# Patient Record
Sex: MALE | Race: WHITE
[De-identification: names, ages, dates, MRNs, and addresses within clinical notes are randomized per-mention and may not be internally consistent; named-entity substitution may affect disease eponyms.]

---

## 2017-03-02 ENCOUNTER — HOSPITAL ENCOUNTER (EMERGENCY)
Age: 47
Discharge: HOME | End: 2017-03-02
Payer: OTHER GOVERNMENT

## 2017-03-05 ENCOUNTER — HOSPITAL ENCOUNTER (EMERGENCY)
Age: 47
Discharge: HOME | End: 2017-03-05
Payer: OTHER GOVERNMENT

## 2017-03-05 DIAGNOSIS — R03.0: ICD-10-CM

## 2017-03-05 DIAGNOSIS — F41.9: Primary | ICD-10-CM

## 2017-03-05 DIAGNOSIS — H66.001: ICD-10-CM

## 2017-03-10 ENCOUNTER — HOSPITAL ENCOUNTER (EMERGENCY)
Age: 47
Discharge: HOME | End: 2017-03-10
Payer: OTHER GOVERNMENT

## 2017-03-10 DIAGNOSIS — F41.9: Primary | ICD-10-CM

## 2017-03-10 PROCEDURE — 99283 EMERGENCY DEPT VISIT LOW MDM: CPT

## 2019-03-01 ENCOUNTER — HOSPITAL ENCOUNTER (EMERGENCY)
Dept: HOSPITAL 76 - ED | Age: 49
Discharge: HOME | End: 2019-03-01
Payer: OTHER GOVERNMENT

## 2019-03-01 VITALS — SYSTOLIC BLOOD PRESSURE: 121 MMHG | DIASTOLIC BLOOD PRESSURE: 88 MMHG

## 2019-03-01 DIAGNOSIS — F41.9: Primary | ICD-10-CM

## 2019-03-01 DIAGNOSIS — R10.13: ICD-10-CM

## 2019-03-01 LAB
ANION GAP SERPL CALCULATED.4IONS-SCNC: 8 MMOL/L (ref 6–13)
BUN SERPL-MCNC: 15 MG/DL (ref 6–20)
CALCIUM UR-MCNC: 9.4 MG/DL (ref 8.5–10.3)
CHLORIDE SERPL-SCNC: 107 MMOL/L (ref 101–111)
CO2 SERPL-SCNC: 26 MMOL/L (ref 21–32)
CREAT SERPLBLD-SCNC: 1 MG/DL (ref 0.6–1.2)
GFRSERPLBLD MDRD-ARVRAT: 79 ML/MIN/{1.73_M2} (ref 89–?)
GLUCOSE SERPL-MCNC: 105 MG/DL (ref 70–100)
SODIUM SERPLBLD-SCNC: 141 MMOL/L (ref 135–145)

## 2019-03-01 PROCEDURE — 99281 EMR DPT VST MAYX REQ PHY/QHP: CPT

## 2019-03-01 PROCEDURE — 80048 BASIC METABOLIC PNL TOTAL CA: CPT

## 2019-03-01 PROCEDURE — 36415 COLL VENOUS BLD VENIPUNCTURE: CPT

## 2019-03-01 PROCEDURE — 99283 EMERGENCY DEPT VISIT LOW MDM: CPT

## 2019-03-01 NOTE — ED PHYSICIAN DOCUMENTATION
History of Present Illness





- Stated complaint


Stated Complaint: STOMACH BURNING, FEELINGS OF PASSING OUT





- Chief complaint


Chief Complaint: MHE





- History obtained from


History obtained from: Patient





- History of Present Illness


Timing: Prior to arrival





- Additonal information


Additional information: 





The patient is a 48-year-old male with history of anxiety disorder and 

agoraphobia, who reports having a near-syncopal episode while at [employer] just

prior to arrival.  He had been in the store for a short time when he felt 

anxious, and felt as if he would pass out.  He ran out of the store, but his 

symptoms persisted.  He denies any associated chest pain, shortness of breath, 

fever, cough, or vomiting.  He has history of similar symptoms in the past, and 

states he gets anxiety symptoms on an almost daily basis.  He has not been 

sleeping well lately.  He does not drink any coffee or other caffeine-containing

beverages.  He denies the use of alcohol or drugs.  He has a history of 

disability from the [place] because of his anxiety disorder.





Review of Systems


Constitutional: denies: Fever, Fatigue


Eyes: denies: Decreased vision


Ears: denies: Tinnitus/ringing


Nose: denies: Congestion


Throat: denies: Sore throat


Cardiac: denies: Chest pain / pressure


Respiratory: denies: Dyspnea, Cough


GI: reports: Nausea.  denies: Abdominal Pain, Vomiting


: denies: Dysuria


Skin: denies: Rash


Musculoskeletal: denies: Back pain, Extremity pain


Neurologic: denies: Focal weakness, Numbness, Headache


Psychiatric: reports: Anxiety





PD PAST MEDICAL HISTORY





- Past Medical History


Cardiovascular: None


Respiratory: None


Endocrine/Autoimmune: None


GI: None


Psych: Anxiety





- Past Surgical History


Past Surgical History: No





- Present Medications


Home Medications: 


                                Ambulatory Orders











 Medication  Instructions  Recorded  Confirmed


 


Lorazepam [Ativan] 1 mg PO TID PRN #15 tablet 03/02/17 03/10/17


 


PARoxetine [Paxil] 20 mg PO DAILY #30 tablet 03/02/17 03/10/17


 


Azithromycin [Zithromax] 250 mg PO DAILY #6 tablet 03/05/17 03/10/17


 


LORazepam [Lorazepam] 1 mg PO QID PRN #20 tablet 03/10/17 














- Allergies


Allergies/Adverse Reactions: 


                                    Allergies











Allergy/AdvReac Type Severity Reaction Status Date / Time


 


paroxetine [From Paxil] Allergy  Hallucinati Verified 03/01/19 13:45





   ons  














- Living Situation


Living Situation: reports: With family


Living Arrangement: reports: At home





- Social History


Does the pt smoke?: No


Smoking Status: Never smoker


Does the pt drink ETOH?: No


Does the pt have substance abuse?: No





- POLST


Patient has POLST: No





PD ED PE NORMAL





- Vitals


Vital signs reviewed: Yes (Initially hypertensive.)





- General


General: Alert and oriented X 3, Well developed/nourished





- HEENT


HEENT: Atraumatic, EOMI, Moist mucous membranes, Pharynx benign





- Neck


Neck: Supple, no meningeal sign, No adenopathy





- Cardiac


Cardiac: RRR





- Respiratory


Respiratory: No respiratory distress, Clear bilaterally





- Abdomen


Abdomen: Soft, Other (Mild epigastric tenderness to palpation, without rebound 

or guarding.)





- Back


Back: No CVA TTP





- Derm


Derm: No rash





- Extremities


Extremities: No edema, No calf tenderness / cord





- Neuro


Neuro: Alert and oriented X 3, No motor deficit, No sensory deficit





- Psych


Psych: Other (There is minimally anxious at this time, without shakiness or 

apparent respiratory distress.  He verbalizes freely regarding his symptoms.)





Results





- Vitals


Vitals: 


                                     Oxygen











O2 Source                      Room air

















- Labs


Labs: 


                                Laboratory Tests











  03/01/19





  15:40


 


Sodium  141


 


Potassium  4.3


 


Chloride  107


 


Carbon Dioxide  26


 


Anion Gap  8.0


 


BUN  15


 


Creatinine  1.0


 


Estimated GFR (MDRD)  79 L


 


Glucose  105 H


 


Calcium  9.4














PD MEDICAL DECISION MAKING





- ED course


Complexity details: reviewed old records, reviewed results, re-evaluated 

patient, considered differential, d/w patient


ED course: 


The patient's presentation is most consistent with acute anxiety reaction, with 

history of anxiety disorder and agoraphobia.  His presentation does not suggest 

dehydration, acute infectious process, neurologic or cardiovascular disorder.  

The possibility of epigastric distress was considered.  GI cocktail was 

administered to no appreciable effect.  The patient's symptoms resolved 

spontaneously over a course of observation in the emergency department.  No 

further treatment is clinically warranted at this time.  I discussed with him 

potentially worrisome signs or symptoms that should prompt reevaluation in the 

emergency department.








Departure





- Departure


Disposition: 01 Home, Self Care


Clinical Impression: 


 Anxiety attack





Condition: Stable


Instructions:  ED Stress React


Follow-Up: 


Peggy Sadler MD [Provider Admit Priv/Credential] - 


Comments: 


Follow-up with your primary physician within 2 weeks.  Call to schedule 

appointment.


Return to the emergency department if you develop progressively increasing 

anxiety, shortness of breath, lightheadedness, or otherwise worsening symptoms.


Discharge Date/Time: 03/01/19 16:35

## 2020-03-02 ENCOUNTER — HOSPITAL ENCOUNTER (EMERGENCY)
Dept: HOSPITAL 76 - ED | Age: 50
LOS: 1 days | Discharge: HOME | End: 2020-03-03
Payer: OTHER GOVERNMENT

## 2020-03-02 DIAGNOSIS — M79.89: Primary | ICD-10-CM

## 2020-03-02 DIAGNOSIS — R21: ICD-10-CM

## 2020-03-02 PROCEDURE — 99283 EMERGENCY DEPT VISIT LOW MDM: CPT

## 2020-03-02 PROCEDURE — 99284 EMERGENCY DEPT VISIT MOD MDM: CPT

## 2020-03-02 PROCEDURE — 93971 EXTREMITY STUDY: CPT

## 2020-03-02 NOTE — ED PHYSICIAN DOCUMENTATION
History of Present Illness





- Stated complaint


Stated Complaint: L LEG SWELLING





- Chief complaint


Chief Complaint: Wound





- History obtained from


History obtained from: Patient





- History of Present Illness


Timing: How many weeks ago (2-3)


Improved by: nothing


Worsened by: no apparent inciting nor exacerbating factors





- Additonal information


Additional information: 





c/o 2-3 weeks of gradual onset, gradually increasing LLE swelling, erythema, 

pruritis. He says he has same symptoms (LLE only) every year around this time 

but typically resolves with application of some OTC topical medications and thus

has not seen a doctor for this. Denies injury, denies chest pain, denies dyspnea





Review of Systems


Constitutional: denies: Fever, Chills, Sweats


Cardiac: reports: Reviewed and negative


Respiratory: reports: Reviewed and negative


Skin: reports: Rash (LLE)


Musculoskeletal: reports: Extremity swelling





PD PAST MEDICAL HISTORY





- Past Medical History


Past Medical History: Yes


Cardiovascular: None


Respiratory: None


Endocrine/Autoimmune: None


GI: None


Psych: Anxiety





- Past Surgical History


Past Surgical History: No





- Present Medications


Home Medications: 


                                Ambulatory Orders











 Medication  Instructions  Recorded  Confirmed


 


Lorazepam [Ativan] 1 mg PO TID PRN #15 tablet 03/02/17 03/10/17


 


PARoxetine [Paxil] 20 mg PO DAILY #30 tablet 03/02/17 03/10/17


 


Azithromycin [Zithromax] 250 mg PO DAILY #6 tablet 03/05/17 03/10/17


 


LORazepam [Lorazepam] 1 mg PO QID PRN #20 tablet 03/10/17 


 


Sulfamethox/Trimeth 800/160 1 each PO BID #14 tablet 03/03/20 





[Bactrim Ds 800/160]   














- Allergies


Allergies/Adverse Reactions: 


                                    Allergies











Allergy/AdvReac Type Severity Reaction Status Date / Time


 


paroxetine [From Paxil] Allergy  Hallucinati Verified 03/01/19 13:45





   ons  














- Social History


Does the pt smoke?: No


Smoking Status: Never smoker


Does the pt drink ETOH?: No


Does the pt have substance abuse?: No





- Immunizations


Immunizations are current?: No





- POLST


Patient has POLST: No





PD ED PE NORMAL





- Vitals


Vital signs reviewed: Yes





- General


General: Alert and oriented X 3, No acute distress, Well developed/nourished





- Cardiac


Cardiac: RRR, No murmur





PD ED PE EXPANDED





- Extremities


Extremities: Swelling.  No: Tenderness, Limited ROM


MICHAELA LE visual: 


                            __________________________














                            __________________________





 1 - rash (patchy, flat erythema with some areas of confluence. not warmer to 

touch than surrounding skin or other extremity), swelling








Results





- Vitals


Vitals: 


                               Vital Signs - 24 hr











  03/02/20 03/03/20





  22:07 01:02


 


Temperature 37.4 C 


 


Heart Rate 96 80


 


Respiratory 18 18





Rate  


 


Blood Pressure 170/100 H 134/82 H


 


O2 Saturation 96 98








                                     Oxygen











O2 Source                      Room air

















- Rads (name of study)


  ** LLE US


Radiology: Prelim report reviewed, See rad report





PD MEDICAL DECISION MAKING





- ED course


Complexity details: reviewed results, re-evaluated patient, considered 

differential, d/w patient


ED course: 





US negative for DVT. some elements of exam suggest cellulitis, and thus will rx 

bactrim for this possibility. 





Departure





- Departure


Disposition: 01 Home, Self Care


Clinical Impression: 


 Left leg swelling





Condition: Good


Instructions:  ED Leg Swelling Unilateral


Follow-Up: 


Karly Dumont MD [Primary Care Provider] - 


Prescriptions: 


Sulfamethox/Trimeth 800/160 [Bactrim Ds 800/160] 1 each PO BID #14 tablet


Discharge Date/Time: 03/03/20 01:02

## 2020-03-03 VITALS — DIASTOLIC BLOOD PRESSURE: 82 MMHG | SYSTOLIC BLOOD PRESSURE: 134 MMHG

## 2020-03-03 NOTE — ULTRASOUND REPORT
Reason:  atraumatic swelling LLE

Procedure Date:  03/02/2020   

Accession Number:  [id number] / [id number]                    

Procedure:  US  - Duplex Ext Veins Left CPT Code:  

 

***Final Report***

 

 

FULL RESULT:

 

 

EXAM:

LEFT LOWER EXTREMITY VENOUS ULTRASOUND

 

EXAM DATE: 3/2/2020 11:59 PM

 

CLINICAL HISTORY: Atraumatic swelling left lower extremity.

 

COMPARISON: None.

 

TECHNIQUE: Real-time sonographic vascular imaging was performed by the 

sonographer through the lower extremity utilizing both color-flow and 

Doppler spectral analysis. Multiple representative static images were 

saved for review.

 

FINDINGS:

Common Femoral Vein (CFV): Normal.

CFV-GSV Junction: Normal.

Profunda Femoral Vein (PFV): Normal.

Femoral Vein (FV) Prox: Normal.

Femoral Vein (FV) Mid: Normal.

Femoral Vein (FV) Dist: Normal.

Popliteal Vein: Normal.

Posterior Tibial Veins: Normal.

Peroneal Veins: Normal.

 

Other: None.

IMPRESSION:

No evidence for left leg deep venous thrombosis.

 

[facility]

## 2021-04-15 ENCOUNTER — HOSPITAL ENCOUNTER (EMERGENCY)
Dept: HOSPITAL 76 - ED | Age: 51
Discharge: HOME | End: 2021-04-15
Payer: OTHER GOVERNMENT

## 2021-04-15 VITALS — DIASTOLIC BLOOD PRESSURE: 90 MMHG | SYSTOLIC BLOOD PRESSURE: 130 MMHG

## 2021-04-15 DIAGNOSIS — K08.89: Primary | ICD-10-CM

## 2021-04-15 DIAGNOSIS — K02.9: ICD-10-CM

## 2021-04-15 PROCEDURE — 99284 EMERGENCY DEPT VISIT MOD MDM: CPT

## 2021-04-15 PROCEDURE — 99282 EMERGENCY DEPT VISIT SF MDM: CPT

## 2021-04-15 NOTE — ED PHYSICIAN DOCUMENTATION
History of Present Illness





- Stated complaint


Stated Complaint: TOOTH PX/RT SIDE PX





- Chief complaint


Chief Complaint: Heent





- Additonal information


Additional information: 





50-year-old male presents emergency department for evaluation of acute on 

chronic tooth pain.  He has multiple teeth that are missing and decayed to the 

gumline especially the upper molars on both sides of his mouth.  Over the last 

few days he has been having increasing pain especially in the right upper jaw 

with radiation to the ear.  The only thing that makes it better is sipping water

or soda.  [facility] den

tist as soon as possible but he admits that he has not seen a dentist for likely

about 20 years now. He has been applying Anbesol and Orajel to his teeth as well

as taking 600 of Motrin 3 times a day and Tylenol.





He has no fevers or trismus.  Normal phonation and swallow.  No facial swelling.





Review of Systems


Constitutional: denies: Fever, Chills


Eyes: reports: Reviewed and negative


Ears: reports: Reviewed and negative


Nose: reports: Reviewed and negative


Throat: reports: Dental pain / toothache.  denies: Sore throat, Swollen tonsils,

Swallowed foreign body


Cardiac: reports: Reviewed and negative


Respiratory: reports: Reviewed and negative


GI: reports: Reviewed and negative


: reports: Reviewed and negative


Skin: reports: Reviewed and negative


Musculoskeletal: reports: Reviewed and negative





PD PAST MEDICAL HISTORY





- Past Medical History


Past Medical History: Yes


Cardiovascular: None


Respiratory: None


Endocrine/Autoimmune: None


GI: None


Psych: Anxiety





- Past Surgical History


Past Surgical History: No





- Present Medications


Home Medications: 


                                Ambulatory Orders











 Medication  Instructions  Recorded  Confirmed


 


Lorazepam [Ativan] 1 mg PO TID PRN #15 tablet 03/02/17 03/10/17


 


PARoxetine [Paxil] 20 mg PO DAILY #30 tablet 03/02/17 03/10/17


 


Azithromycin [Zithromax] 250 mg PO DAILY #6 tablet 03/05/17 03/10/17


 


LORazepam [Lorazepam] 1 mg PO QID PRN #20 tablet 03/10/17 


 


Sulfamethox/Trimeth 800/160 1 each PO BID #14 tablet 03/03/20 





[Bactrim Ds 800/160]   


 


Amox/Clav 875/125 [Augmentin] 1 each PO Q12H #14 tablet 04/15/21 


 


HYDROcod/ACETAM 5/325 [Norco 5/325] 1 - 2 tablet PO Q6H PRN #10 tablet 04/15/21 














- Allergies


Allergies/Adverse Reactions: 


                                    Allergies











Allergy/AdvReac Type Severity Reaction Status Date / Time


 


paroxetine [From Paxil] Allergy  Hallucinati Verified 04/15/21 20:15





   ons  














- Social History


Does the pt smoke?: No


Smoking Status: Never smoker


Does the pt drink ETOH?: No


Does the pt have substance abuse?: No





- Immunizations


Immunizations are current?: No





- POLST


Patient has POLST: No





PD ED PE EXPANDED





- General


General: Alert, In Pain





- HEENT


HEENT: EOMI, Ears normal, Pharynx normal, Dental decay, Dental TTP.  No: 

Pharyngeal erythema, Swollen tonsils, Tonsillar exudate, Dental trauma, Dental 

abscess (Upper and lower molars on both sides of his jaw are decayed to the 

gumline.  There is mild gumline erythema without fluctuance or obvious 

drainage.)





- Neck


Neck: Supple w/out meningeal sx, No tenderness.  No: Adenopathy





- Cardiac


Cardiac: Regular Rate, Radial strong equal, Pedal strong equal, Cap refill < 2 

sec





- Respiratory


Respiratory: Clear to ausultation calista.  No: Distress, Labored





- Abdomen


Abdomen: Normal Bowel sounds.  No: Tender to palpation





Results





- Vitals


Vitals: 





                               Vital Signs - 24 hr











  04/15/21





  20:11


 


Temperature 36.7 C


 


Heart Rate 72


 


Respiratory 16





Rate 


 


Blood Pressure 154/95 H


 


O2 Saturation 96








                                     Oxygen











O2 Source                      Room air

















PD MEDICAL DECISION MAKING





- ED course


Complexity details: reviewed results, re-evaluated patient, d/w patient


ED course: 





50-year-old male who drove himself here to the emergency department presents for

acute on chronic dentalgia.  He has multiple molars on both sides of the jaw 

that are decayed to the gumline.  There is no trismus dysphonia or fevers.  He 

is working to get set up with a dentist.  He does seem to be taking reasonable 

measures at home to control his dental pain but has been unsuccessful and 

appears quite uncomfortable.  I suspect that he may have an underlying infection

causing the worsening pain therefore I will start him on a course of Augmentin. 

I will also write for a limited amount of narcotics including a prepack to take 

home tonight.  Emergent and worrisome return precautions were discussed.





Departure





- Departure


Disposition: 01 Home, Self Care


Clinical Impression: 


 Dentalgia, Dental decay





Condition: Stable


Record reviewed to determine appropriate education?: Yes


Prescriptions: 


Amox/Clav 875/125 [Augmentin] 1 each PO Q12H #14 tablet


HYDROcod/ACETAM 5/325 [Norco 5/325] 1 - 2 tablet PO Q6H PRN #10 tablet


 PRN Reason: Pain


Comments: 


You are seen in the emergency department tonight for dental pain.  You have 

multiple decaying teeth that are the cause of your dental pain.  Your ears are 

not infected.  Your ear pain is likely referred due to the nerves in the teeth 

that are inflamed.  I think the pain will improve if we put you on a course of 

antibiotics.  Your first dose of Augmentin was given in the emergency department

tonight tomorrow please fill the prescription and begin taking as directed.  

Would also like you to rinse your mouth with warm salt water 3 times a day.  

Continue with the ibuprofen and Orajel.  I have prescribed a very limited amount

of hydrocodone to be used for the severe pain.  The emergency department will 

not be able to refill this in the future.





Continue to establish yourself with a dentist as soon as you are able.





Return to the emergency department if you have facial swelling, cannot open your

jaw, cannot swallow normally speak normally or develop any fevers.

## 2022-02-26 ENCOUNTER — HOSPITAL ENCOUNTER (EMERGENCY)
Dept: HOSPITAL 76 - ED | Age: 52
Discharge: HOME | End: 2022-02-26
Payer: OTHER GOVERNMENT

## 2022-02-26 VITALS — SYSTOLIC BLOOD PRESSURE: 155 MMHG | DIASTOLIC BLOOD PRESSURE: 95 MMHG

## 2022-02-26 DIAGNOSIS — K02.9: Primary | ICD-10-CM

## 2022-02-26 PROCEDURE — 99282 EMERGENCY DEPT VISIT SF MDM: CPT

## 2022-02-26 PROCEDURE — 99283 EMERGENCY DEPT VISIT LOW MDM: CPT

## 2022-02-26 NOTE — ED PHYSICIAN DOCUMENTATION
PD HPI HEENT





- Stated complaint


Stated Complaint: R EAR PX





- Chief complaint


Chief Complaint: Heent





- History obtained from


History obtained from: Patient





- Additional information


Additional information: 





4 days of severe right mandibular dental pain.  He has known cavity there.  Has 

not seen a dentist in quite some time.  Now pain radiating to the right ear with

popping.  No fevers.





Review of Systems


Constitutional: reports: Reviewed and negative


Nose: reports: Reviewed and negative


Cardiac: reports: Reviewed and negative


Respiratory: reports: Reviewed and negative





PD PAST MEDICAL HISTORY





- Past Medical History


Cardiovascular: None


Respiratory: None


Endocrine/Autoimmune: None


GI: None


Psych: Anxiety





- Past Surgical History


Past Surgical History: No





- Present Medications


Home Medications: 


                                Ambulatory Orders











 Medication  Instructions  Recorded  Confirmed


 


HYDROcod/ACETAM 5/325 [Norco 5/325] 1 - 2 tablet PO Q6H PRN #10 tablet 04/15/21 


 


Amox/Clav 875/125 [Augmentin] 1 each PO Q12H #20 tablet 02/26/22 


 


HYDROcod/ACETAM 5/325 [Norco 5/325] 1 - 2 tab PO Q6H PRN #15 tablet 02/26/22 


 


Ibuprofen 2 tab ORAL Q6HR 02/26/22 02/26/22


 


Ibuprofen [Motrin] 800 mg PO Q8H PRN #30 tablet 02/26/22 














- Allergies


Allergies/Adverse Reactions: 


                                    Allergies











Allergy/AdvReac Type Severity Reaction Status Date / Time


 


paroxetine [From Paxil] Allergy  Hallucinati Verified 04/15/21 20:15





   ons  














- Social History


Does the pt smoke?: No


Smoking Status: Never smoker


Does the pt drink ETOH?: No


Does the pt have substance abuse?: No





- Immunizations


Immunizations are current?: No





- POLST


Patient has POLST: No





PD ED PE NORMAL





- Vitals


Vital signs reviewed: Yes





- General


General: Alert and oriented X 3, No acute distress





- HEENT


HEENT: Other (Right TM appears normal, he has generally poor dentition with many

cavities, the culprit tooth is the last remaining mandibular molar with 

tenderness but no sublingual edema or trismus.  No facial swelling.)





- Neck


Neck: Supple, no meningeal sign, No bony TTP





- Cardiac


Cardiac: RRR, No murmur





- Neuro


Neuro: Alert and oriented X 3, Normal speech





- Psych


Psych: Normal mood, Normal affect





Results





- Vitals


Vitals: 





                               Vital Signs - 24 hr











  02/26/22





  18:46


 


Temperature 36.3 C L


 


Heart Rate 84


 


Respiratory 20





Rate 


 


Blood Pressure 155/95 H


 


O2 Saturation 99








                                     Oxygen











O2 Source                      Room air

















Departure





- Departure


Disposition: 01 Home, Self Care


Clinical Impression: 


 Pain due to dental caries





Condition: Good


Record reviewed to determine appropriate education?: Yes


Instructions:  ED Tooth Pain


Prescriptions: 


Amox/Clav 875/125 [Augmentin] 1 each PO Q12H #20 tablet


Ibuprofen [Motrin] 800 mg PO Q8H PRN #30 tablet


 PRN Reason: PAIN &/OR FEVER


HYDROcod/ACETAM 5/325 [Norco 5/325] 1 - 2 tab PO Q6H PRN #15 tablet


 PRN Reason: Pain


Comments: 


I sent your prescription electronically to [facility] in [place].





It is very important that you follow-up with a dentist.  When it comes to dental

problems like yours, the emergency department can only offer a short-term 

solution to your long-term problem.  A couple of low cost options for dental 

care include:


[facility] in [place], calls [phone] for an appointment


Or


[facility] in [place], call [phone] for an 

appointment.





I am prescribing a short course of narcotic pain medication for you. These are 

potentially dangerous and addictive medications that should be used carefully.


These medications may constipate you. Take an over-the-counter stool softener 

(docusate) twice daily with plenty of water while taking these medications. If 

you go 24 hours without a bowel movement, take over-the-counter miralax, per 

package instructions.


Do not drink or drive while taking these medications.


If you received narcotic or sedating medications while in the emergency 

department, do not drive for 24 hours.


Store this medication in a safe, secure place and out of reach of children.


It is a violation of federal law to give or sell this medication to another 

person or to use in a manner other than prescribed.


The ED will not refill narcotic prescriptions, including prescriptions lost or 

stolen.


To dispose of unwanted medications:


1. [facility] at [address] in 

[place] has a medication drop box. They accept prescription medications (in 

pill form) Monday through Friday 9:00 a.m. to 5:00 p.m.


2. [facility] [employer] [facility] accepts prescription medications (in

pill form only) for disposal year round. Call [phone] for more 

information.


3. Contact the [facility] for the next [facility] sponsored prescription 

drug collection event. [phone], [phone], or [phone] 

[phone];


Note that many narcotic pain relievers also contain Tylenol/acetaminophen.  

Please ensure that your total dose of acetaminophen from all sources does not 

exceed 3 g (3000 mg) per day.